# Patient Record
Sex: FEMALE | Race: WHITE | NOT HISPANIC OR LATINO | Employment: FULL TIME | URBAN - NONMETROPOLITAN AREA
[De-identification: names, ages, dates, MRNs, and addresses within clinical notes are randomized per-mention and may not be internally consistent; named-entity substitution may affect disease eponyms.]

---

## 2019-01-15 ENCOUNTER — HOSPITAL ENCOUNTER (EMERGENCY)
Facility: HOSPITAL | Age: 34
Discharge: HOME | End: 2019-01-15
Attending: EMERGENCY MEDICINE | Admitting: EMERGENCY MEDICINE

## 2019-01-15 VITALS
HEART RATE: 86 BPM | SYSTOLIC BLOOD PRESSURE: 131 MMHG | WEIGHT: 227.9 LBS | DIASTOLIC BLOOD PRESSURE: 75 MMHG | OXYGEN SATURATION: 100 % | TEMPERATURE: 97.6 F | HEIGHT: 65 IN | RESPIRATION RATE: 18 BRPM | BODY MASS INDEX: 37.97 KG/M2

## 2019-01-15 DIAGNOSIS — M54.12 CERVICAL RADICULOPATHY: Primary | ICD-10-CM

## 2019-01-15 PROCEDURE — 99282 EMERGENCY DEPT VISIT SF MDM: CPT

## 2019-01-15 RX ORDER — OMEPRAZOLE 20 MG/1
20 CAPSULE, DELAYED RELEASE ORAL DAILY
COMMUNITY

## 2019-01-15 NOTE — ED TRIAGE NOTES
Patient presents to the ED with complaints of right shoulder pain for the past 2 weeks. Patient states she was seen at [facility] a few days after she started to hurt. Patient states she had a CT scan and states they told her she possibly has a slipped disc in her neck and arthritis. Patient states she was told to follow up with her PCP and when she did her PCP recommended patient follow up with outpatient physical therapy. Patient has not yet been to therapy and states the pain is getting worse.

## 2019-01-15 NOTE — ED PROVIDER NOTES
"Subjective   Patient presents to emergency department for right shoulder pain x 10 days.  Patient works in a factory and performs repetative motions.      Seen at [facility] on 1/7/2019.  Per note : \"Alex Valerio is a 33 y.o. female who presents to the ED complaining of right shoulder pain that began 2 days ago. Patient works in a factory with repetitive motions. When she returned home from work 2 days ago, she has right shoulder pain. The pain radiates into her neck and down her arm. She also c/o tingling in her right hand and headache. Turning her head to the right increases her pain. Patient took 800mg Motrin which provided some relief. She denies any numbness, weakness, or loss of motor control of the RUE. She also denies any chest pain, SOB, slurred speech, facial droop, fall, or injury to the shoulder. She has not recently seen a chiropractor. Pt's PMSHx includes PCOS, hysterectomy, idiopathic intracranial HTN, and chronic migraine.\"    She followed up with her PCP who recommended physical therapy.  Patient is unhappy with this treatment plan and has not been to physical therapy.  Patient has NSAID, skeletal muscle relaxant which provides some relief.             History provided by:  Patient   used: No    Shoulder Problem   Location:  Shoulder  Shoulder location:  R shoulder  Injury: no    Pain details:     Quality:  Shooting    Radiates to: right arm and right neck.    Severity:  Moderate    Onset quality:  Sudden    Duration:  10 days    Timing:  Constant    Progression:  Worsening  Prior injury to area:  No  Associated symptoms: neck pain and numbness    Associated symptoms: no back pain, no decreased range of motion, no fever, no muscle weakness, no stiffness and no tingling        Review of Systems   Constitutional: Negative for chills and fever.   HENT: Negative for sore throat and trouble swallowing.    Eyes: Negative for visual disturbance.   Respiratory: Negative " "for shortness of breath.    Gastrointestinal: Negative for abdominal pain.   Genitourinary: Negative for dysuria and flank pain.   Musculoskeletal: Positive for neck pain. Negative for back pain and stiffness.   Skin: Negative for color change.   Allergic/Immunologic: Negative for immunocompromised state.   Neurological: Positive for numbness. Negative for dizziness, syncope, facial asymmetry, speech difficulty, weakness and headaches.   Hematological: Does not bruise/bleed easily.   Psychiatric/Behavioral: Negative for confusion.       History reviewed. No pertinent past medical history.    Allergies   Allergen Reactions   • Codeine GI Intolerance       History reviewed. No pertinent surgical history.    History reviewed. No pertinent family history.    Social History     Socioeconomic History   • Marital status:      Spouse name: Not on file   • Number of children: Not on file   • Years of education: Not on file   • Highest education level: Not on file   Tobacco Use   • Smoking status: Current Every Day Smoker     Packs/day: 0.50     Types: Cigarettes   Substance and Sexual Activity   • Alcohol use: No     Frequency: Never   • Drug use: No           Objective      /75 (BP Location: Left arm, Patient Position: Sitting)   Pulse 86   Temp 97.6 °F (36.4 °C) (Tympanic)   Resp 18   Ht 165.1 cm (65\")   Wt 103 kg (227 lb 14.4 oz)   SpO2 100%   BMI 37.92 kg/m²     Physical Exam   Constitutional: She is oriented to person, place, and time. She appears well-developed and well-nourished.   HENT:   Head: Normocephalic and atraumatic.   Eyes: Conjunctivae and EOM are normal. Pupils are equal, round, and reactive to light.   Cardiovascular: Normal rate, regular rhythm, normal heart sounds and intact distal pulses.   Pulmonary/Chest: Effort normal and breath sounds normal. No respiratory distress. She has no wheezes.   Abdominal: Soft. Bowel sounds are normal. She exhibits no distension. There is no guarding. "   Musculoskeletal: She exhibits tenderness. She exhibits no edema or deformity.   Right shoulder pain with radiation into right arm and right trapezius/neck reproducible with movement of right arm.     Neurological: She is alert and oriented to person, place, and time.   Skin: Skin is warm. Capillary refill takes less than 2 seconds.   Psychiatric: She has a normal mood and affect. Her behavior is normal. Thought content normal.   Nursing note and vitals reviewed.      Procedures           ED Course  ED Course as of Jonh 15 1455   Tue Jonh 15, 2019   1406 Reviewed Cervical spine CT, no significant findings although symptoms are consistent with cervical nerve root impingement.    [ISABELLE]      ED Course User Index  [ISABELLE] Yimi Fleming PA-C                  [facility]      Final diagnoses:   Cervical radiculopathy            Yimi lFeming PA-C  01/15/19 1457